# Patient Record
Sex: MALE | Race: WHITE | NOT HISPANIC OR LATINO | URBAN - NONMETROPOLITAN AREA
[De-identification: names, ages, dates, MRNs, and addresses within clinical notes are randomized per-mention and may not be internally consistent; named-entity substitution may affect disease eponyms.]

---

## 2018-07-23 ENCOUNTER — APPOINTMENT (RX ONLY)
Dept: URBAN - NONMETROPOLITAN AREA CLINIC 26 | Facility: CLINIC | Age: 68
Setting detail: DERMATOLOGY
End: 2018-07-23

## 2018-07-23 DIAGNOSIS — B07.8 OTHER VIRAL WARTS: ICD-10-CM

## 2018-07-23 DIAGNOSIS — Z85.828 PERSONAL HISTORY OF OTHER MALIGNANT NEOPLASM OF SKIN: ICD-10-CM

## 2018-07-23 DIAGNOSIS — Z86.006 PERSONAL HISTORY OF MELANOMA IN-SITU: ICD-10-CM

## 2018-07-23 DIAGNOSIS — L57.0 ACTINIC KERATOSIS: ICD-10-CM

## 2018-07-23 DIAGNOSIS — L82.1 OTHER SEBORRHEIC KERATOSIS: ICD-10-CM

## 2018-07-23 PROBLEM — Z85.820 PERSONAL HISTORY OF MALIGNANT MELANOMA OF SKIN: Status: ACTIVE | Noted: 2018-07-23

## 2018-07-23 PROBLEM — E78.5 HYPERLIPIDEMIA, UNSPECIFIED: Status: ACTIVE | Noted: 2018-07-23

## 2018-07-23 PROBLEM — H91.90 UNSPECIFIED HEARING LOSS, UNSPECIFIED EAR: Status: ACTIVE | Noted: 2018-07-23

## 2018-07-23 PROCEDURE — ? BENIGN DESTRUCTION COSMETIC

## 2018-07-23 PROCEDURE — 17000 DESTRUCT PREMALG LESION: CPT

## 2018-07-23 PROCEDURE — 99202 OFFICE O/P NEW SF 15 MIN: CPT | Mod: 25

## 2018-07-23 PROCEDURE — 17003 DESTRUCT PREMALG LES 2-14: CPT

## 2018-07-23 PROCEDURE — ? LIQUID NITROGEN

## 2018-07-23 PROCEDURE — ? COUNSELING

## 2018-07-23 ASSESSMENT — LOCATION DETAILED DESCRIPTION DERM
LOCATION DETAILED: LEFT DISTAL DORSAL FOREARM
LOCATION DETAILED: RIGHT ANTERIOR PROXIMAL THIGH
LOCATION DETAILED: 1ST WEB SPACE RIGHT HAND
LOCATION DETAILED: NASAL DORSUM
LOCATION DETAILED: RIGHT ULNAR DORSAL HAND
LOCATION DETAILED: RIGHT INDEX FINGERTIP
LOCATION DETAILED: LEFT INFERIOR CENTRAL MALAR CHEEK
LOCATION DETAILED: LEFT LATERAL MALAR CHEEK
LOCATION DETAILED: LEFT DISTAL ULNAR DORSAL FOREARM
LOCATION DETAILED: LEFT FOREHEAD
LOCATION DETAILED: LEFT PROXIMAL RADIAL DORSAL FOREARM

## 2018-07-23 ASSESSMENT — LOCATION ZONE DERM
LOCATION ZONE: FACE
LOCATION ZONE: HAND
LOCATION ZONE: LEG
LOCATION ZONE: FINGER
LOCATION ZONE: FACE
LOCATION ZONE: ARM
LOCATION ZONE: NOSE

## 2018-07-23 ASSESSMENT — LOCATION SIMPLE DESCRIPTION DERM
LOCATION SIMPLE: LEFT CHEEK
LOCATION SIMPLE: LEFT FOREARM
LOCATION SIMPLE: RIGHT THIGH
LOCATION SIMPLE: LEFT CHEEK
LOCATION SIMPLE: NOSE
LOCATION SIMPLE: RIGHT INDEX FINGER
LOCATION SIMPLE: RIGHT HAND
LOCATION SIMPLE: LEFT FOREHEAD
LOCATION SIMPLE: RIGHT THUMB

## 2019-07-09 ENCOUNTER — APPOINTMENT (RX ONLY)
Dept: URBAN - NONMETROPOLITAN AREA CLINIC 26 | Facility: CLINIC | Age: 69
Setting detail: DERMATOLOGY
End: 2019-07-09

## 2019-07-09 DIAGNOSIS — L57.0 ACTINIC KERATOSIS: ICD-10-CM

## 2019-07-09 DIAGNOSIS — L82.1 OTHER SEBORRHEIC KERATOSIS: ICD-10-CM

## 2019-07-09 DIAGNOSIS — Z86.006 PERSONAL HISTORY OF MELANOMA IN-SITU: ICD-10-CM

## 2019-07-09 DIAGNOSIS — Z85.828 PERSONAL HISTORY OF OTHER MALIGNANT NEOPLASM OF SKIN: ICD-10-CM

## 2019-07-09 PROBLEM — Z85.820 PERSONAL HISTORY OF MALIGNANT MELANOMA OF SKIN: Status: ACTIVE | Noted: 2019-07-09

## 2019-07-09 PROBLEM — L70.0 ACNE VULGARIS: Status: ACTIVE | Noted: 2019-07-09

## 2019-07-09 PROCEDURE — 17000 DESTRUCT PREMALG LESION: CPT

## 2019-07-09 PROCEDURE — ? LIQUID NITROGEN

## 2019-07-09 PROCEDURE — ? COUNSELING

## 2019-07-09 PROCEDURE — 99213 OFFICE O/P EST LOW 20 MIN: CPT | Mod: 25

## 2019-07-09 PROCEDURE — 17003 DESTRUCT PREMALG LES 2-14: CPT

## 2019-07-09 ASSESSMENT — LOCATION ZONE DERM
LOCATION ZONE: FACE
LOCATION ZONE: HAND
LOCATION ZONE: NOSE
LOCATION ZONE: TRUNK
LOCATION ZONE: ARM

## 2019-07-09 ASSESSMENT — LOCATION SIMPLE DESCRIPTION DERM
LOCATION SIMPLE: UPPER BACK
LOCATION SIMPLE: NOSE
LOCATION SIMPLE: LEFT TEMPLE
LOCATION SIMPLE: LEFT FOREARM
LOCATION SIMPLE: LEFT CHEEK
LOCATION SIMPLE: RIGHT HAND

## 2019-07-09 ASSESSMENT — LOCATION DETAILED DESCRIPTION DERM
LOCATION DETAILED: LEFT CENTRAL TEMPLE
LOCATION DETAILED: RIGHT RADIAL DORSAL HAND
LOCATION DETAILED: INFERIOR THORACIC SPINE
LOCATION DETAILED: LEFT INFERIOR CENTRAL MALAR CHEEK
LOCATION DETAILED: LEFT DISTAL RADIAL DORSAL FOREARM
LOCATION DETAILED: NASAL SUPRATIP

## 2019-07-09 NOTE — PROCEDURE: COUNSELING
Detail Level: Detailed
Detail Level: Simple
Detail Level: Zone
Quality 137: Melanoma: Continuity Of Care - Recall System: Patient information entered into a recall system that includes: target date for the next exam specified AND a process to follow up with patients regarding missed or unscheduled appointments
When Should The Patient Follow-Up For Their Next Full-Body Skin Exam?: 1 Year

## 2020-09-17 ENCOUNTER — APPOINTMENT (RX ONLY)
Dept: URBAN - NONMETROPOLITAN AREA CLINIC 26 | Facility: CLINIC | Age: 70
Setting detail: DERMATOLOGY
End: 2020-09-17

## 2020-09-17 DIAGNOSIS — Z85.828 PERSONAL HISTORY OF OTHER MALIGNANT NEOPLASM OF SKIN: ICD-10-CM

## 2020-09-17 DIAGNOSIS — D22 MELANOCYTIC NEVI: ICD-10-CM

## 2020-09-17 DIAGNOSIS — L82.1 OTHER SEBORRHEIC KERATOSIS: ICD-10-CM

## 2020-09-17 DIAGNOSIS — L57.0 ACTINIC KERATOSIS: ICD-10-CM

## 2020-09-17 DIAGNOSIS — Z86.006 PERSONAL HISTORY OF MELANOMA IN-SITU: ICD-10-CM

## 2020-09-17 DIAGNOSIS — L11.1 TRANSIENT ACANTHOLYTIC DERMATOSIS [GROVER]: ICD-10-CM

## 2020-09-17 PROBLEM — D22.9 MELANOCYTIC NEVI, UNSPECIFIED: Status: ACTIVE | Noted: 2020-09-17

## 2020-09-17 PROBLEM — Z85.820 PERSONAL HISTORY OF MALIGNANT MELANOMA OF SKIN: Status: ACTIVE | Noted: 2020-09-17

## 2020-09-17 PROBLEM — M12.9 ARTHROPATHY, UNSPECIFIED: Status: ACTIVE | Noted: 2020-09-17

## 2020-09-17 PROBLEM — J30.1 ALLERGIC RHINITIS DUE TO POLLEN: Status: ACTIVE | Noted: 2020-09-17

## 2020-09-17 PROCEDURE — ? COUNSELING

## 2020-09-17 PROCEDURE — 17003 DESTRUCT PREMALG LES 2-14: CPT

## 2020-09-17 PROCEDURE — 99214 OFFICE O/P EST MOD 30 MIN: CPT | Mod: 25

## 2020-09-17 PROCEDURE — 17000 DESTRUCT PREMALG LESION: CPT

## 2020-09-17 PROCEDURE — ? LIQUID NITROGEN

## 2020-09-17 ASSESSMENT — LOCATION SIMPLE DESCRIPTION DERM
LOCATION SIMPLE: RIGHT HAND
LOCATION SIMPLE: LEFT CHEEK
LOCATION SIMPLE: NOSE
LOCATION SIMPLE: LEFT TEMPLE
LOCATION SIMPLE: UPPER BACK

## 2020-09-17 ASSESSMENT — LOCATION ZONE DERM
LOCATION ZONE: HAND
LOCATION ZONE: NOSE
LOCATION ZONE: FACE
LOCATION ZONE: TRUNK

## 2020-09-17 ASSESSMENT — LOCATION DETAILED DESCRIPTION DERM
LOCATION DETAILED: NASAL SUPRATIP
LOCATION DETAILED: LEFT CENTRAL TEMPLE
LOCATION DETAILED: LEFT MEDIAL TEMPLE
LOCATION DETAILED: LEFT INFERIOR CENTRAL MALAR CHEEK
LOCATION DETAILED: RIGHT RADIAL DORSAL HAND
LOCATION DETAILED: SUPERIOR THORACIC SPINE

## 2021-12-15 ENCOUNTER — APPOINTMENT (RX ONLY)
Dept: URBAN - NONMETROPOLITAN AREA CLINIC 26 | Facility: CLINIC | Age: 71
Setting detail: DERMATOLOGY
End: 2021-12-15

## 2021-12-15 DIAGNOSIS — Z86.006 PERSONAL HISTORY OF MELANOMA IN-SITU: ICD-10-CM | Status: STABLE

## 2021-12-15 DIAGNOSIS — Z85.828 PERSONAL HISTORY OF OTHER MALIGNANT NEOPLASM OF SKIN: ICD-10-CM | Status: RESOLVED

## 2021-12-15 DIAGNOSIS — D22 MELANOCYTIC NEVI: ICD-10-CM

## 2021-12-15 DIAGNOSIS — L91.8 OTHER HYPERTROPHIC DISORDERS OF THE SKIN: ICD-10-CM

## 2021-12-15 DIAGNOSIS — L57.0 ACTINIC KERATOSIS: ICD-10-CM

## 2021-12-15 DIAGNOSIS — D485 NEOPLASM OF UNCERTAIN BEHAVIOR OF SKIN: ICD-10-CM

## 2021-12-15 PROBLEM — Z85.820 PERSONAL HISTORY OF MALIGNANT MELANOMA OF SKIN: Status: ACTIVE | Noted: 2021-12-15

## 2021-12-15 PROBLEM — D22.5 MELANOCYTIC NEVI OF TRUNK: Status: ACTIVE | Noted: 2021-12-15

## 2021-12-15 PROBLEM — L70.0 ACNE VULGARIS: Status: ACTIVE | Noted: 2021-12-15

## 2021-12-15 PROBLEM — J30.1 ALLERGIC RHINITIS DUE TO POLLEN: Status: ACTIVE | Noted: 2021-12-15

## 2021-12-15 PROBLEM — D48.5 NEOPLASM OF UNCERTAIN BEHAVIOR OF SKIN: Status: ACTIVE | Noted: 2021-12-15

## 2021-12-15 PROCEDURE — ? SKIN TAG REMOVAL

## 2021-12-15 PROCEDURE — ? BIOPSY BY SHAVE METHOD

## 2021-12-15 PROCEDURE — 17003 DESTRUCT PREMALG LES 2-14: CPT

## 2021-12-15 PROCEDURE — 17000 DESTRUCT PREMALG LESION: CPT | Mod: 59

## 2021-12-15 PROCEDURE — 11102 TANGNTL BX SKIN SINGLE LES: CPT

## 2021-12-15 PROCEDURE — 99212 OFFICE O/P EST SF 10 MIN: CPT | Mod: 25

## 2021-12-15 PROCEDURE — ? COUNSELING

## 2021-12-15 PROCEDURE — ? LIQUID NITROGEN

## 2021-12-15 PROCEDURE — 11200 RMVL SKIN TAGS UP TO&INC 15: CPT | Mod: 59

## 2021-12-15 ASSESSMENT — LOCATION DETAILED DESCRIPTION DERM
LOCATION DETAILED: LEFT LATERAL EYEBROW
LOCATION DETAILED: LEFT MEDIAL UPPER BACK
LOCATION DETAILED: RIGHT ANTERIOR MEDIAL PROXIMAL THIGH
LOCATION DETAILED: NASAL DORSUM
LOCATION DETAILED: RIGHT ANTERIOR PROXIMAL THIGH
LOCATION DETAILED: LEFT MEDIAL ZYGOMA
LOCATION DETAILED: LEFT CENTRAL TEMPLE
LOCATION DETAILED: LEFT INFERIOR CENTRAL MALAR CHEEK
LOCATION DETAILED: LEFT SUPERIOR MEDIAL MALAR CHEEK

## 2021-12-15 ASSESSMENT — LOCATION SIMPLE DESCRIPTION DERM
LOCATION SIMPLE: LEFT EYEBROW
LOCATION SIMPLE: LEFT ZYGOMA
LOCATION SIMPLE: NOSE
LOCATION SIMPLE: LEFT CHEEK
LOCATION SIMPLE: RIGHT THIGH
LOCATION SIMPLE: LEFT TEMPLE
LOCATION SIMPLE: LEFT UPPER BACK

## 2021-12-15 ASSESSMENT — LOCATION ZONE DERM
LOCATION ZONE: LEG
LOCATION ZONE: NOSE
LOCATION ZONE: FACE
LOCATION ZONE: TRUNK

## 2021-12-15 NOTE — PROCEDURE: LIQUID NITROGEN
Render Note In Bullet Format When Appropriate: No
Show Applicator Variable?: Yes
Post-Care Instructions: I reviewed with the patient in detail post-care instructions. Patient is to wear sunprotection, and avoid picking at any of the treated lesions. Pt may apply Vaseline to crusted or scabbing areas.
Consent: The patient's consent was obtained including but not limited to risks of crusting, scabbing, blistering, scarring, darker or lighter pigmentary change, recurrence, incomplete removal and infection.
Number Of Freeze-Thaw Cycles: 1 freeze-thaw cycle
Detail Level: Detailed
Duration Of Freeze Thaw-Cycle (Seconds): 10

## 2021-12-15 NOTE — PROCEDURE: BIOPSY BY SHAVE METHOD
Size Of Lesion In Cm: 0
Notification Instructions: Patient will be notified of biopsy results. However, patient instructed to call the office if not contacted within 2 weeks.
Electrodesiccation Text: The wound bed was treated with electrodesiccation after the biopsy was performed.
Hide Topical Anesthesia?: No
Biopsy Method: double edge Personna blade
Post-Care Instructions: I reviewed with the patient in detail post-care instructions. Patient is to keep the biopsy site dry overnight, and then apply bacitracin twice daily until healed. Patient may apply hydrogen peroxide soaks to remove any crusting.
Biopsy Type: H and E
Information: Selecting Yes will display possible errors in your note based on the variables you have selected. This validation is only offered as a suggestion for you. PLEASE NOTE THAT THE VALIDATION TEXT WILL BE REMOVED WHEN YOU FINALIZE YOUR NOTE. IF YOU WANT TO FAX A PRELIMINARY NOTE YOU WILL NEED TO TOGGLE THIS TO 'NO' IF YOU DO NOT WANT IT IN YOUR FAXED NOTE.
Detail Level: Detailed
Wound Care: Vaseline
Silver Nitrate Text: The wound bed was treated with silver nitrate after the biopsy was performed.
Hemostasis: Drysol
Was A Bandage Applied: Yes
Billing Type: Third-Party Bill
Electrodesiccation And Curettage Text: The wound bed was treated with electrodesiccation and curettage after the biopsy was performed.
Type Of Destruction Used: Curettage
Dressing: bandage
Cryotherapy Text: The wound bed was treated with cryotherapy after the biopsy was performed.
Anesthesia Volume In Cc: 0.5
Anesthesia Type: 1% lidocaine with epinephrine
Curettage Text: The wound bed was treated with curettage after the biopsy was performed.
Depth Of Biopsy: dermis
Consent: Written consent was obtained and risks were reviewed including but not limited to scarring, infection, bleeding, scabbing, incomplete removal, nerve damage and allergy to anesthesia.

## 2021-12-15 NOTE — PROCEDURE: MIPS QUALITY
Quality 137: Melanoma: Continuity Of Care - Recall System: Patient information entered into a recall system that includes: target date for the next exam specified AND a process to follow up with patients regarding missed or unscheduled appointments
Quality 110: Preventive Care And Screening: Influenza Immunization: Influenza immunization was not ordered or administered, reason not given
Detail Level: Detailed
Quality 138: Melanoma: Coordination Of Care: A treatment plan was communicated to the physicians providing continuing care within one month of diagnosis outlining: diagnosis, tumor thickness and a plan for surgery or alternate care.

## 2021-12-15 NOTE — PROCEDURE: SKIN TAG REMOVAL
Medical Necessity Clause: This procedure was medically necessary because the lesions that were treated were:
Anesthesia Type: 1% lidocaine with epinephrine
Anesthesia Volume In Cc: 1
Consent: Written consent obtained and the risks of skin tag removal was reviewed with the patient including but not limited to bleeding, pigmentary change, infection, pain, and remote possibility of scarring.
Add Associated Diagnoses If Applicable When Selecting Medical Necessity: Yes
Medical Necessity Information: It is in your best interest to select a reason for this procedure from the list below. All of these items fulfill various CMS LCD requirements except the new and changing color options.
Detail Level: Detailed
Include Z78.9 (Other Specified Conditions Influencing Health Status) As An Associated Diagnosis?: No

## 2025-01-21 ENCOUNTER — APPOINTMENT (OUTPATIENT)
Dept: URBAN - NONMETROPOLITAN AREA CLINIC 26 | Facility: CLINIC | Age: 75
Setting detail: DERMATOLOGY
End: 2025-01-21

## 2025-01-21 DIAGNOSIS — Z85.828 PERSONAL HISTORY OF OTHER MALIGNANT NEOPLASM OF SKIN: ICD-10-CM

## 2025-01-21 DIAGNOSIS — Z86.006 PERSONAL HISTORY OF MELANOMA IN-SITU: ICD-10-CM

## 2025-01-21 DIAGNOSIS — L85.3 XEROSIS CUTIS: ICD-10-CM

## 2025-01-21 DIAGNOSIS — L57.8 OTHER SKIN CHANGES DUE TO CHRONIC EXPOSURE TO NONIONIZING RADIATION: ICD-10-CM

## 2025-01-21 PROCEDURE — ? COUNSELING

## 2025-01-21 PROCEDURE — 99203 OFFICE O/P NEW LOW 30 MIN: CPT

## 2025-01-21 PROCEDURE — ? PRESCRIPTION

## 2025-01-21 RX ORDER — TRIAMCINOLONE ACETONIDE 1 MG/G
CREAM TOPICAL
Qty: 453.6 | Refills: 0 | Status: ERX | COMMUNITY
Start: 2025-01-21

## 2025-01-21 RX ADMIN — TRIAMCINOLONE ACETONIDE: 1 CREAM TOPICAL at 00:00

## 2025-01-21 ASSESSMENT — LOCATION SIMPLE DESCRIPTION DERM
LOCATION SIMPLE: LEFT CHEEK
LOCATION SIMPLE: LEFT TEMPLE
LOCATION SIMPLE: NOSE
LOCATION SIMPLE: ABDOMEN

## 2025-01-21 ASSESSMENT — LOCATION DETAILED DESCRIPTION DERM
LOCATION DETAILED: NASAL DORSUM
LOCATION DETAILED: LEFT INFERIOR CENTRAL MALAR CHEEK
LOCATION DETAILED: LEFT CENTRAL TEMPLE
LOCATION DETAILED: PERIUMBILICAL SKIN

## 2025-01-21 ASSESSMENT — LOCATION ZONE DERM
LOCATION ZONE: NOSE
LOCATION ZONE: FACE
LOCATION ZONE: TRUNK

## 2025-01-21 NOTE — PROCEDURE: COUNSELING
Detail Level: Simple
Patient Specific Counseling (Will Not Stick From Patient To Patient): RNB of topical steroids including atrophy discussed.
Detail Level: Detailed